# Patient Record
Sex: FEMALE | Race: BLACK OR AFRICAN AMERICAN | Employment: OTHER | URBAN - METROPOLITAN AREA
[De-identification: names, ages, dates, MRNs, and addresses within clinical notes are randomized per-mention and may not be internally consistent; named-entity substitution may affect disease eponyms.]

---

## 2020-06-23 ENCOUNTER — HOSPITAL ENCOUNTER (INPATIENT)
Age: 50
LOS: 9 days | Discharge: HOME | DRG: 885 | End: 2020-07-02
Attending: PSYCHIATRY & NEUROLOGY | Admitting: PSYCHIATRY & NEUROLOGY
Payer: OTHER GOVERNMENT

## 2020-06-23 PROBLEM — F25.9 SCHIZOAFFECTIVE DISORDER (HCC): Status: ACTIVE | Noted: 2020-06-23

## 2020-06-23 PROCEDURE — 74011250637 HC RX REV CODE- 250/637: Performed by: PSYCHIATRY & NEUROLOGY

## 2020-06-23 PROCEDURE — 65220000003 HC RM SEMIPRIVATE PSYCH

## 2020-06-23 RX ORDER — DULOXETIN HYDROCHLORIDE 30 MG/1
30 CAPSULE, DELAYED RELEASE ORAL DAILY
Status: DISCONTINUED | OUTPATIENT
Start: 2020-06-23 | End: 2020-07-02 | Stop reason: HOSPADM

## 2020-06-23 RX ORDER — PRAZOSIN HYDROCHLORIDE 5 MG/1
5 CAPSULE ORAL
Status: DISCONTINUED | OUTPATIENT
Start: 2020-06-24 | End: 2020-07-02 | Stop reason: HOSPADM

## 2020-06-23 RX ORDER — ARIPIPRAZOLE 10 MG/1
30 TABLET ORAL DAILY
Status: DISCONTINUED | OUTPATIENT
Start: 2020-06-23 | End: 2020-06-24

## 2020-06-23 RX ORDER — HALOPERIDOL 5 MG/1
5 TABLET ORAL
Status: DISCONTINUED | OUTPATIENT
Start: 2020-06-23 | End: 2020-07-02 | Stop reason: HOSPADM

## 2020-06-23 RX ORDER — LORAZEPAM 1 MG/1
1 TABLET ORAL
Status: DISCONTINUED | OUTPATIENT
Start: 2020-06-23 | End: 2020-07-02 | Stop reason: HOSPADM

## 2020-06-23 RX ORDER — DULOXETIN HYDROCHLORIDE 30 MG/1
30 CAPSULE, DELAYED RELEASE ORAL DAILY
Status: DISCONTINUED | OUTPATIENT
Start: 2020-06-24 | End: 2020-06-23

## 2020-06-23 RX ORDER — ARIPIPRAZOLE 10 MG/1
30 TABLET ORAL DAILY
Status: DISCONTINUED | OUTPATIENT
Start: 2020-06-24 | End: 2020-06-23

## 2020-06-23 RX ORDER — HYDROXYZINE PAMOATE 50 MG/1
50 CAPSULE ORAL
Status: DISCONTINUED | OUTPATIENT
Start: 2020-06-23 | End: 2020-07-02 | Stop reason: HOSPADM

## 2020-06-23 RX ORDER — ZOLMITRIPTAN 2.5 MG/1
5 TABLET, FILM COATED ORAL AS NEEDED
Status: DISCONTINUED | OUTPATIENT
Start: 2020-06-23 | End: 2020-06-23 | Stop reason: RX

## 2020-06-23 RX ORDER — HALOPERIDOL 5 MG/ML
5 INJECTION INTRAMUSCULAR
Status: DISCONTINUED | OUTPATIENT
Start: 2020-06-23 | End: 2020-07-02 | Stop reason: HOSPADM

## 2020-06-23 RX ORDER — TRAZODONE HYDROCHLORIDE 50 MG/1
50 TABLET ORAL
Status: DISCONTINUED | OUTPATIENT
Start: 2020-06-23 | End: 2020-07-02 | Stop reason: HOSPADM

## 2020-06-23 RX ORDER — LORAZEPAM 1 MG/1
2 TABLET ORAL
Status: DISCONTINUED | OUTPATIENT
Start: 2020-06-23 | End: 2020-07-02 | Stop reason: HOSPADM

## 2020-06-23 RX ORDER — ZOLPIDEM TARTRATE 5 MG/1
5 TABLET ORAL
Status: DISCONTINUED | OUTPATIENT
Start: 2020-06-23 | End: 2020-07-02 | Stop reason: HOSPADM

## 2020-06-23 RX ADMIN — DULOXETINE 30 MG: 30 CAPSULE, DELAYED RELEASE ORAL at 21:32

## 2020-06-23 RX ADMIN — ARIPIPRAZOLE 30 MG: 10 TABLET ORAL at 21:32

## 2020-06-23 RX ADMIN — ZOLPIDEM TARTRATE 5 MG: 5 TABLET ORAL at 21:33

## 2020-06-23 NOTE — GROUP NOTE
COURTNEY ALMONTE GROUP DOCUMENTATION INDIVIDUAL Group Therapy Note Date: 6/23/2020 Group Start Time: 5641 Group End Time: 1175 Group Topic: Nursing [facility] 1 ADULT CHEM Priyanka Velazquez RN IP BH GROUP DOCUMENTATION GROUP Group Therapy Note Attendees: 4 Attendance: Did not attend Rosalinda Yañez RN

## 2020-06-23 NOTE — BH NOTES
MELONIE Admission Note: Pt. Arrived on the unit ambulatory with ambulance worker with her belongings. Pt was checked for contraband upon admission on the unit. Pt was given a copy of the rules upon admission. She was explained in detail of the unit rules during this admission process.

## 2020-06-23 NOTE — PROGRESS NOTES
Patient arrived to unit via wheel chair with staff members present. Upon arrival patient in good, pleasant mood able to answer all questions appropriately. Patient Verbalizes being SI \"all the time. \" But does not plan in harming herself. Patient states \"I will let yall know if I feel like hearting myself. \" Per patient she has recently changed medications at her request and feels \"I need to get back on my old medication. \" \"I don't feel like myself and just want to get better. \" Confirms hearing voices. Per patient Voices continue to call her name. When patient is trying to sleep, she reports \"feeling a presence and feels like someone is blowing air on her face. \"  Vitals maintained. WNL. Orientated to unit. Contraband search complete. 100% of lunch eaten. Patient now resting in room quietly. Will continue to guide and assist patient as needed. RN's will initiate, develop, implement, review or revise treatment plan.

## 2020-06-23 NOTE — BSMART NOTE
OCCUPATIONAL THERAPY PROGRESS NOTE Group Time:  6113 Attendance: The patient attended full group. Participation: The patient participated with minimal elaboration in the activity. Attention: The patient was able to focus on the activity. Interaction: The patient acknowledges others or responds to questions,  with no spontaneous interaction. Able to ID positive self talk to use.

## 2020-06-24 PROBLEM — F25.0 SCHIZOAFFECTIVE DISORDER, BIPOLAR TYPE (HCC): Status: ACTIVE | Noted: 2020-06-23

## 2020-06-24 LAB
ALBUMIN SERPL-MCNC: 3.3 G/DL (ref 3.4–5)
ALBUMIN/GLOB SERPL: 1 {RATIO} (ref 0.8–1.7)
ALP SERPL-CCNC: 84 U/L (ref 45–117)
ALT SERPL-CCNC: 18 U/L (ref 13–56)
AST SERPL-CCNC: 12 U/L (ref 10–38)
BILIRUB DIRECT SERPL-MCNC: <0.1 MG/DL (ref 0–0.2)
BILIRUB SERPL-MCNC: 1 MG/DL (ref 0.2–1)
CHOLEST SERPL-MCNC: 197 MG/DL
GLOBULIN SER CALC-MCNC: 3.4 G/DL (ref 2–4)
HBA1C MFR BLD: 5.6 % (ref 4.2–5.6)
HDLC SERPL-MCNC: 48 MG/DL (ref 40–60)
HDLC SERPL: 4.1 {RATIO} (ref 0–5)
LDLC SERPL CALC-MCNC: 129.4 MG/DL (ref 0–100)
LIPID PROFILE,FLP: ABNORMAL
PROT SERPL-MCNC: 6.7 G/DL (ref 6.4–8.2)
TRIGL SERPL-MCNC: 98 MG/DL (ref ?–150)
TSH SERPL DL<=0.05 MIU/L-ACNC: 0.84 UIU/ML (ref 0.36–3.74)
VLDLC SERPL CALC-MCNC: 19.6 MG/DL

## 2020-06-24 PROCEDURE — 80061 LIPID PANEL: CPT

## 2020-06-24 PROCEDURE — 65220000003 HC RM SEMIPRIVATE PSYCH

## 2020-06-24 PROCEDURE — 93005 ELECTROCARDIOGRAM TRACING: CPT

## 2020-06-24 PROCEDURE — 80076 HEPATIC FUNCTION PANEL: CPT

## 2020-06-24 PROCEDURE — 74011250637 HC RX REV CODE- 250/637: Performed by: PSYCHIATRY & NEUROLOGY

## 2020-06-24 PROCEDURE — 84443 ASSAY THYROID STIM HORMONE: CPT

## 2020-06-24 PROCEDURE — 83036 HEMOGLOBIN GLYCOSYLATED A1C: CPT

## 2020-06-24 RX ORDER — LITHIUM CARBONATE 300 MG
300 TABLET ORAL 2 TIMES DAILY
Status: DISCONTINUED | OUTPATIENT
Start: 2020-06-24 | End: 2020-06-27

## 2020-06-24 RX ORDER — DILTIAZEM HYDROCHLORIDE 120 MG/1
120 CAPSULE, COATED, EXTENDED RELEASE ORAL DAILY
Status: DISCONTINUED | OUTPATIENT
Start: 2020-06-25 | End: 2020-07-02 | Stop reason: HOSPADM

## 2020-06-24 RX ORDER — POLYETHYLENE GLYCOL 3350 17 G/17G
17 POWDER, FOR SOLUTION ORAL DAILY
Status: DISCONTINUED | OUTPATIENT
Start: 2020-06-24 | End: 2020-07-02 | Stop reason: HOSPADM

## 2020-06-24 RX ORDER — VALACYCLOVIR HYDROCHLORIDE 500 MG/1
500 TABLET, FILM COATED ORAL DAILY
Status: DISCONTINUED | OUTPATIENT
Start: 2020-06-24 | End: 2020-07-02 | Stop reason: HOSPADM

## 2020-06-24 RX ADMIN — LITHIUM CARBONATE 300 MG: 300 TABLET ORAL at 21:01

## 2020-06-24 RX ADMIN — HALOPERIDOL 5 MG: 5 TABLET ORAL at 08:42

## 2020-06-24 RX ADMIN — POLYETHYLENE GLYCOL 3350 17 G: 17 POWDER, FOR SOLUTION ORAL at 21:03

## 2020-06-24 RX ADMIN — HYDROXYZINE PAMOATE 50 MG: 50 CAPSULE ORAL at 08:42

## 2020-06-24 RX ADMIN — ZOLPIDEM TARTRATE 5 MG: 5 TABLET ORAL at 21:01

## 2020-06-24 RX ADMIN — CARIPRAZINE 1.5 MG: 1.5 CAPSULE, GELATIN COATED ORAL at 21:01

## 2020-06-24 RX ADMIN — VALACYCLOVIR HYDROCHLORIDE 500 MG: 500 TABLET, FILM COATED ORAL at 12:38

## 2020-06-24 NOTE — PROGRESS NOTES
conducted an Spirituality Group for Meghann De León, who is a 52 y.o.,female. Patient's Primary Language is: [place]. According to the patient's EMR Bahai Affiliation is: [facility].     The reason the Patient came to the hospital is:   Patient Active Problem List    Diagnosis Date Noted    Schizoaffective disorder ([facility] [address] 06/23/2020         conducted Spirituality Group for ________________ who was one of ____participants. The  provided the following Interventions:  Continued the relationship of care and support. Listened empathically. Offered prayer and assurance of continued prayer on patients behalf. Chart reviewed. The following outcomes were achieved:  Patient expressed gratitude for 's visit. Assessment:  There are no further spiritual or Restorationist issues which require Spiritual Care Services interventions at this time. Plan:  Chaplains will continue to follow and will provide pastoral care on an as needed/requested basis.  recommends bedside caregivers page  on duty if patient shows signs of acute spiritual or emotional distress.        [address] [employer]   [phone]

## 2020-06-24 NOTE — GROUP NOTE
COURTNEY  GROUP DOCUMENTATION INDIVIDUAL Group Therapy Note Date: 6/23/2020 Group Start Time: 2015 Group End Time: 2045 Group Topic: Nursing [facility] ADULT CHEM DEP Candice Reina, RN 
 
[facility] GROUP DOCUMENTATION GROUP Group Therapy Note Attendees: 9 Attendance: Attended Interventions/techniques: Challenged and Informed Follows Directions: Followed directions Interactions: Interacted appropriately Mental Status: Calm Behavior/appearance: Cooperative Goals Achieved: Able to listen to others Myla Berman Carls

## 2020-06-24 NOTE — BH NOTES
PTA meds updated with list from [facility] [phone]. Updated & current medication list noted with \"taken in past week. \"  Medications ordered via telephone order from doctor.

## 2020-06-24 NOTE — BSMART NOTE
OCCUPATIONAL THERAPY PROGRESS NOTE Group time:1020 Sinan Cowan The patient did not awaken/get up when called for group.
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
## 2020-06-24 NOTE — BH NOTES
Patient has been visible on the unit throughout the evening. Patient attended groups and interacted appropriately. Patient also interacted with peers while they watched television. Patient stated that she felt \"a little better, I haven't slept in days\". Staff will continue to monitor patient for safety.

## 2020-06-24 NOTE — PROGRESS NOTES
Problem: Suicide  Goal: *STG: Remains safe in hospital  Description: Patient will remain safe in the hospital daily   Outcome: Progressing Towards Goal  Note: Will contact verbalize if thoughts of self harm. Goal: *STG: Seeks staff when feelings of self harm or harm towards others arise  Description: Patient will seek staff when feelings of self harm or harm towards others arise daily   Outcome: Progressing Towards Goal  Goal: *STG: Attends activities and groups  Description: Patient will attend scheduled activities and groups daily   Outcome: Progressing Towards Goal    Denies HI. States \"I always feel suicidal.\" Patient contracted with staff that she will notify if having any thought of self harm. Patient remains in common area throughout the day. Interacting well with peers. Med compliant. Will continue to monitor and guide as needed.

## 2020-06-24 NOTE — GROUP NOTE
COURTNEY  GROUP DOCUMENTATION INDIVIDUAL Group Therapy Note Date: 6/24/2020 Group Start Time: 0483 Group End Time: 0200 Group Topic: Recreational/Music Therapy [facility] ADULT CHEM DEP Rupal Beavers RN; Smiley VALDEZ  GROUP DOCUMENTATION GROUP Group Therapy Note Attendees: 5 Attendance: Did not attend Isidoro Callahan

## 2020-06-24 NOTE — H&P
[address] [facility] HISTORY AND PHYSICAL    Name:  Ayana Plasencia  MR#:   [id number]  :  1970  ACCOUNT #:  [de-identified]  ADMIT DATE:  2020    IDENTIFYING INFORMATION:  The patient is a 28-year-old  female, admitted to this facility on a voluntary basis on the above-mentioned date. BASIS FOR ADMISSION:  The patient presented herself to the [facility] Emergency Department with a history of having problems with increased symptoms of depression and psychotic symptoms with thoughts of harming herself; however, no specific plan other than cutting her wrists. The patient stated that she had lapse on her medication compliance since she was staying in [place] with family members and her  who is very supportive of her and who is always sure that she takes her medications did not go on the trip to [place] this time. So, not taking her medications, becoming increasingly depressed, describing the presence of egodystonic auditory hallucinations, the patient describes suicidal thoughts. The patient with a chronic history of schizoaffective disorder, bipolar type, who apparently has required to be treated through the [facility] in that area, was offered with a voluntary inpatient admission to our facility which she accepted and so the basis for this hospitalization. PSYCHIATRIC HISTORY:  The patient described what appears to be a history of a mood disorder; diagnosed as schizoaffective disorder, bipolar type when she was in her 25s. It appears that the patient had done well in the past with a combination of medications which she said multiple meds had been prescribed for her; however, she described a positive treatment response to prescription for lithium carbonate and also duloxetine.   A recent change to bupropion instead of duloxetine did not help, she said, but mostly not taking her medications as prescribed is one of the reasons for which symptoms reoccurred. She has been prescribed with different atypicals, right now Abilify 30 mg a day which she has been taking, she says; however, has not been good enough to help her with the presence of egodystonic auditory hallucinations and also what appears to be increased skin sensations, possible tactile hallucinations. MEDICAL HISTORY:  The patient has a history of atrial fibrillation for which she has been prescribed with Cardizem CD. For her bipolar illness, she described being treated with lithium carbonate, duloxetine and also aripiprazole, the only dose that she knew was Abilify 30 mg daily. In addition, she has been prescribed in the past for the treatment of migraine headaches and what she described being a sleep disorder. Surgical history is apparently negative. The patient was not able to let us know the doses of medications that she was taking prior to admission; however, treatment included prescriptions for diltiazem XR; atorvastatin; prazosin; sumatriptan for migraine headaches; aripiprazole as stated; valacyclovir (Valtrex) 500 mg daily; lithium carbonate, apparent dose 300 mg twice a day; Cymbalta, not clear if 30 or 60 mg a day; trazodone 100 mg at nighttime; and Ambien 5 mg at bedtime as needed. ALLERGIES:  THE PATIENT IS DESCRIBED TO BE ALLERGIC TO IODINE. LABORATORY DATA:  Prior to her admission, multiple labs were performed at [facility] ER including a CBC with differential that showed normal test results. Urinalysis is negative. Blood chemistry showed a sodium 139, potassium 4.1, chloride of 107, blood sugar 122, BUN 16, creatinine 1.2, estimated GFR above 60 mL/min, liver function tests normal.  Urine pregnancy test negative, acetaminophen and salicylate levels below range, alcohol levels below 3.0. Urine drug screen negative.   Since admission, other tests have included a repeat of her liver function tests which are normal; a lipid panel that showed triglycerides of 98, total cholesterol 197, HDL 48, cholesterol-HDL ratio 4.1 and LDL of 129.4. A1c normal at 5.6%. TSH normal at 0.84 international units/mL. Due to her history of atrial fibrillation, an electrocardiogram has also been requested. ALCOHOL AND DRUG HISTORY:  Negative for alcohol, illicit drugs and/or abusing over-the-counter medications. FAMILIAL PSYCHIATRIC HISTORY:  The patient described this being her second marriage, having two kids, 34and 27year old daughter and son who are not at home anymore, they are grown, she says. A strong familial history of schizophrenia; she described her son, an aunt, and her mother being diagnosed as schizophrenic. On her father's side, there is a history of dementia, specifically Alzheimer's disease and also Parkinson's disease, she indicated. The patient has been  for 11 years to her second . Not clear as to how long her first marriage lasted. She retired from the [employer] after 27 years. She is being provided treatment through the [facility] in [facility].  appears to be very supportive of her. MENTAL STATUS EXAM:  Mental status exam is that of a female who looks her stated age. During this evaluation, there is no evidence of alcohol or any other type of drug-related signs of intoxication or withdrawal symptoms. During the evaluation, she described the presence of auditory hallucinations in the form of whispering voices or someone breathing in her ear, questionable tactile hallucinations also described, the patient is stating having the sensation that someone is touching her skin. No evidence of cognitive impairment noted during this initial evaluation, the patient described being depressed and having suicidal thoughts; however, able to maintain self-control here and to talk to the staff if unable to do so. The patient's insight and judgment are fair at present.   Intellectual capacity is within the average range.    CLINICAL IMPRESSION:  AXIS I:  Schizoaffective disorder, bipolar type, currently depressed versus mixed. AXIS II:  Deferred. AXIS III:  Atrial fibrillation by history. Obesity. Migraine headaches by history. Dyslipidemia by history. History of insomnia. TREATMENT PLAN:  1. The patient was admitted to the adult CD program, will be seen daily and will be referred to the groups within context of the program.  2.  The patient will be prescribed with a combination of medications which will include prescriptions for lithium carbonate and Cymbalta; however, instead of prescribing aripiprazole which the patient is taking, but not to be able to help with her psychotic symptoms, we will proceed to prescribe Vraylar 1.5 mg every night which will be started tonight. Side effects and adverse effects in association to prescription for this atypical were clearly discussed with the patient. 3.  The patient will have an assigned inpatient  whom we will ask to contact the patient's family. ESTIMATED LENGTH OF STAY AND PROGNOSIS:  ELOS is 5-7 days. Prognosis will depend upon treatment response and treatment compliance.       Carol Lebron MD, LFAPA      [id number]  D:  06/24/2020 10:28  T:  06/24/2020 15:13  JOB #:  [id number]

## 2020-06-24 NOTE — PROGRESS NOTES
06/24/20 [facility] work   Attendance Attended   Number of participants 6   Time in 1030   Time out 1103   Total Time 33   Interventions/techniques Supported; Informed;Validated;Promoted peer support;Provided feedback   Follows directions Followed directions   Interactions Interacted appropriately   Mental Status Blunted;Depressed   Behavior/appearance Attentive; Cooperative   Long Term Risk of Suicide Low   Immediate Risk for Suicide Low   Suicide Protective Factors Contacts reliable for safety   Risk of Violence Low   Risk of Trauma High   Goals Achieved Able to receive feedback; Able to listen to others; Able to manage/cope with feelings

## 2020-06-24 NOTE — BSMART NOTE
ART THERAPY GROUP PROGRESS NOTE PATIENT SCHEDULED FOR GROUP AT: 1400 ATTENDANCE: Full PARTICIPATION LEVEL: Participates fully in the art process ATTENTION LEVEL : Able to focus on task FOCUS: Grounding SYMBOLIC & THEMATIC CONTENT AS NOTED IN IMAGERY: She was calm, compliant, and presented with a blunted affect. She was invested in the task at hand and her approach to task was planned-out and purposeful. She mentioned feeling that her life's stress is getting in the way of her finding peace, however was vague and spoke minimally.

## 2020-06-25 LAB
ATRIAL RATE: 63 BPM
CALCULATED P AXIS, ECG09: 82 DEGREES
CALCULATED R AXIS, ECG10: 70 DEGREES
CALCULATED T AXIS, ECG11: 64 DEGREES
DIAGNOSIS, 93000: NORMAL
P-R INTERVAL, ECG05: 148 MS
Q-T INTERVAL, ECG07: 408 MS
QRS DURATION, ECG06: 86 MS
QTC CALCULATION (BEZET), ECG08: 417 MS
VENTRICULAR RATE, ECG03: 63 BPM

## 2020-06-25 PROCEDURE — 74011250637 HC RX REV CODE- 250/637: Performed by: PSYCHIATRY & NEUROLOGY

## 2020-06-25 PROCEDURE — 65220000003 HC RM SEMIPRIVATE PSYCH

## 2020-06-25 RX ADMIN — ZOLPIDEM TARTRATE 5 MG: 5 TABLET ORAL at 21:32

## 2020-06-25 RX ADMIN — DILTIAZEM HYDROCHLORIDE 120 MG: 120 CAPSULE, COATED, EXTENDED RELEASE ORAL at 08:27

## 2020-06-25 RX ADMIN — PRAZOSIN HYDROCHLORIDE 5 MG: 5 CAPSULE ORAL at 21:00

## 2020-06-25 RX ADMIN — LITHIUM CARBONATE 300 MG: 300 TABLET ORAL at 21:13

## 2020-06-25 RX ADMIN — VALACYCLOVIR HYDROCHLORIDE 500 MG: 500 TABLET, FILM COATED ORAL at 08:27

## 2020-06-25 RX ADMIN — DULOXETINE 30 MG: 30 CAPSULE, DELAYED RELEASE ORAL at 08:27

## 2020-06-25 RX ADMIN — LITHIUM CARBONATE 300 MG: 300 TABLET ORAL at 08:27

## 2020-06-25 RX ADMIN — POLYETHYLENE GLYCOL 3350 17 G: 17 POWDER, FOR SOLUTION ORAL at 21:34

## 2020-06-25 RX ADMIN — CARIPRAZINE 1.5 MG: 1.5 CAPSULE, GELATIN COATED ORAL at 21:11

## 2020-06-25 NOTE — BH NOTES
Patient was calm and cooperative towards unit rules. Although she did participate in groups and went outside for fresh air she was quiet and isolative. Patient minimally engaged with others, but was respectful when interacting with others. Patient was laying down more in the bed during this shift. Staff will continue to monitor patient for safety.

## 2020-06-25 NOTE — GROUP NOTE
[facility] GROUP DOCUMENTATION INDIVIDUAL Group Therapy Note Date: 6/24/2020 Group Start Time: 2000 Group End Time: 2030 Group Topic: Nursing [facility] 1 ADULT CHEM DEP Omar Pires, RN 
 
[facility] GROUP DOCUMENTATION GROUP Group Therapy Note Attendees: 6 Attendance: Attended Interventions/techniques: Challenged and Informed Follows Directions: Followed directions Interactions: Interacted appropriately Mental Status: Calm Behavior/appearance: Attentive and Cooperative Goals Achieved: Able to listen to others and Able to give feedback to another Valerie Walter.  Wes Villegas

## 2020-06-25 NOTE — BSMART NOTE
OCCUPATIONAL THERAPY PROGRESS NOTE Group Time:  0326 Attendance: The patient attended full group. Participation: The patient participated with minimal elaboration in the activity. Attention: The patient was able to focus on the activity. Interaction: The patient acknowledges others or responds to questions,  with no spontaneous interaction. Able to ID positive affirmation and thought to work on related to her stated idea to be more positive about the relationships she has.

## 2020-06-25 NOTE — BSMART NOTE
Pt. Is a 45-year-old female with history of Schizoaffective disorder, bipolar type, currently depressed versus mixed and PTSD. Pt. Was admitted to this facility for depression and ideations to harm self with a plan to cut self with a knife. SW Contact:  SW met with pt. to discuss dc planning. \" I am okay, I guess\". \" I still feel something touching and breathing on my face\". Pt. Expressed having tactile  sensations in addition to disruption of sleep. Pt contracts for safety. Pt. informed SW she was upset with spouse after he had contacted. Pt. Expressed she does not want to feel pressured in talking to him and other family members until she is ready. Pt. States she plans to call some family members today. Pt. Feels medication is working and notes some small improvements. SW discussed coping strategies. Pt states she cooks to cope. SW discussed the benefits of therapy. Pt is interested in having a therapist addition to receiving output services at the [facility] program. Pt. 's mood is slowly improving, has fair insight and judgment. SW will continue to provide supports and assist with dc planning. Gabe Vergara MA, LMHP_R Covering

## 2020-06-25 NOTE — BSMART NOTE
ART THERAPY GROUP PROGRESS NOTE PATIENT SCHEDULED FOR GROUP AT: 7670 ATTENDANCE: Full PARTICIPATION LEVEL: Participates fully in the art process ATTENTION LEVEL : Able to focus on task FOCUS: Stress vs peace SYMBOLIC & THEMATIC CONTENT AS NOTED IN IMAGERY: She was calm, compliant, and invested in the task at hand. She claimed that she struggles with finding things to do since group home and that she needs to [place] more active. \" She was able to identify healthy means to take care of both her physical and mental wellbeing.

## 2020-06-25 NOTE — BSMART NOTE
SOCIAL WORK GROUP THERAPY PROGRESS NOTE Group Time:  10:15am   1:15pm 
 
Group Topic:  Coping Skills    C D Issues Group Participation:   
 
Pt moderately involved during group discussion but remained attentive. Discussion included the process of making \"Change\" by answering questions on handout with an emphasis on strengths & weaknesses to support improving one's self esteem. Did identify several positives about self. Understanding better connection between thoughts & Emotions

## 2020-06-25 NOTE — PROGRESS NOTES
[address]  Inpatient Progress Note     Date of Service: 06/25/20  Hospital Day: 2     Subjective/Interval History   06/25/20    Treatment Team Notes:  Notes reviewed and/or discussed and report that Meghann Sousa is a pt with a hx of Schizoaffective disorder, recently admitted after not being tx compliant for a couple of weeks. Symptoms recurring was the bases for her admission, attention invited to the dictated adm note for details. Patient interview: Meghann Sousa was interviewed by this writer today. Treatment with Ann Distel was started. Initial tolerance is acceptable, the pt denying side effects. Obviously is too soon to see any changes. The pt and the undersigned met this morning, with a review of her labs following. Objective     Visit Vitals  /74 (BP 1 Location: Right arm, BP Patient Position: Sitting)   Pulse 79   Temp 98.9 °F (37.2 °C)   Resp 16   Ht 5' 9\" (1.753 m)   Wt 108.4 kg (239 lb)   BMI 35.29 kg/m²     Vitals are stable. No results found for this or any previous visit (from the past 24 hour(s)). Mental Status Examination     Appearance/Hygiene 52 y.o. BLACK OR  female  Hygiene: fair. Behavior/Social Relatedness Appropriate   Musculoskeletal Gait/Station: appropriate  Tone (flaccid, cogwheeling, spastic): not assessed  Psychomotor (hyperkinetic, hypokinetic): calm   Involuntary movements (tics, dyskinesias, akathisa, stereotypies): none   Speech   Rate, rhythm, volume, fluency and articulation are appropriate   Mood   Mixed. Affect    Labile. Thought Process Linear and goal directed   Thought Content and Perceptual Disturbances Denies self-injurious behavior (SIB), suicidal ideation (SI), aggressive behavior or homicidal ideation (HI)  Still describing the presence of auditory hallucinations, mood remains labile. Sensorium and Cognition  Grossly intact   Insight  Improving. Judgment Fair.         Assessment/Plan Psychiatric Diagnoses:   Patient Active Problem List   Diagnosis Code    Schizoaffective disorder, [facility] ([facility] F25.0       Medical Diagnoses: same. Psychosocial and contextual factors: same. Level of impairment/disability: high. 1.  Treatment with Tillman Kawasaki will continue. ,the pt's insurance approves tx with this specific atypical. Will suggest when the prescription is written, to obtain her prescription through her mail prescription options. This will be cheaper for the pt. 2.  Reviewed instructions, risks, benefits and side effects of medications  3. Disposition/Discharge Date: self-care/home, when stable.     Jane Valencia MD, [address] [place] [address]  Psychiatry

## 2020-06-25 NOTE — PROGRESS NOTES
Problem: Suicide  Goal: *STG: Remains safe in hospital  Description: Patient will remain safe in the hospital daily   Outcome: Progressing Towards Goal  Note: Patient will remain safe in the hospital daily  Goal: *STG: Attends activities and groups  Description: Patient will attend scheduled activities and groups daily   Outcome: Progressing Towards Goal  Note: Patient will attend scheduled activities and groups daily       Problem: Depressed Mood (Adult/Pediatric)  Goal: *STG: Complies with medication therapy  Description: Patient will comply with medication therapy daily   Outcome: Progressing Towards Goal  Note: Patient will comply with medication therapy daily     Patient has been visible and active in the day area during this shift. She has attended groups, complied with meds, and engaging with peers. Patient has not verbalized any current delusions or paranoia. Denies current thoughts of self harm. Mood appears stable. No report of dry tongue or side effects today  Will continue to monitor for safety and support.

## 2020-06-26 PROCEDURE — 74011250637 HC RX REV CODE- 250/637: Performed by: PSYCHIATRY & NEUROLOGY

## 2020-06-26 PROCEDURE — 65220000003 HC RM SEMIPRIVATE PSYCH

## 2020-06-26 RX ADMIN — CARIPRAZINE 1.5 MG: 1.5 CAPSULE, GELATIN COATED ORAL at 20:39

## 2020-06-26 RX ADMIN — LITHIUM CARBONATE 300 MG: 300 TABLET ORAL at 20:39

## 2020-06-26 RX ADMIN — ZOLPIDEM TARTRATE 5 MG: 5 TABLET ORAL at 20:39

## 2020-06-26 RX ADMIN — LITHIUM CARBONATE 300 MG: 300 TABLET ORAL at 08:11

## 2020-06-26 RX ADMIN — PRAZOSIN HYDROCHLORIDE 5 MG: 5 CAPSULE ORAL at 21:00

## 2020-06-26 RX ADMIN — POLYETHYLENE GLYCOL 3350 17 G: 17 POWDER, FOR SOLUTION ORAL at 20:39

## 2020-06-26 RX ADMIN — DILTIAZEM HYDROCHLORIDE 120 MG: 120 CAPSULE, COATED, EXTENDED RELEASE ORAL at 08:11

## 2020-06-26 RX ADMIN — DULOXETINE 30 MG: 30 CAPSULE, DELAYED RELEASE ORAL at 08:11

## 2020-06-26 RX ADMIN — VALACYCLOVIR HYDROCHLORIDE 500 MG: 500 TABLET, FILM COATED ORAL at 08:11

## 2020-06-26 NOTE — PROGRESS NOTES
[address]  Inpatient Progress Note     Date of Service: 06/26/20  Hospital Day: 3     Subjective/Interval History   06/26/20    Treatment Team Notes:  Notes reviewed and/or discussed and report that Meghann Holland is a pt with a hx of Schizoaffective disorder, still having AH and tactile hallucinations, switched to cariprazine which was started last night. Initial tolerance is good, mild drowsiness described today. Patient interview: Meghann Holland was interviewed by this writer today. As above, cooperating with tx, compliant with her meds, will draw a li level tomorrow. Objective     Visit Vitals  BP 90/62   Pulse 79   Temp 98.3 °F (36.8 °C)   Resp 18   Ht 5' 9\" (1.753 m)   Wt 108.4 kg (239 lb)   BMI 35.29 kg/m²     Mild hypotension, asymptomatic. No results found for this or any previous visit (from the past 24 hour(s)). Mental Status Examination     Appearance/Hygiene 52 y.o. BLACK OR  female  Hygiene: fair   Behavior/Social Relatedness Appropriate   Musculoskeletal Gait/Station: appropriate  Tone (flaccid, cogwheeling, spastic): not assessed  Psychomotor (hyperkinetic, hypokinetic): calmer   Involuntary movements (tics, dyskinesias, akathisa, stereotypies): none   Speech   Rate, rhythm, volume, fluency and articulation are appropriate   Mood   Mixed. Affect    Less labile. Thought Process Linear and goal directed   Thought Content and Perceptual Disturbances Denies self-injurious behavior (SIB), suicidal ideation (SI), aggressive behavior or homicidal ideation (HI)  AH and tactile hallucinations present. Still depressed, with mixed presentation. Sensorium and Cognition  Grossly intact   Insight  Fair. Judgment Fair. Assessment/Plan      Psychiatric Diagnoses:   Patient Active Problem List   Diagnosis Code    Schizoaffective disorder, bipolar [facility] ([facility] F25.0       Medical Diagnoses: same.     Psychosocial and contextual factors: same.    Level of impairment/disability: high. 1.  Will continue current medications combination. Lithium levels in the morning. Will repeat BMP also. 2.  Reviewed instructions, risks, benefits and side effects of medications  3. Disposition/Discharge Date: self-care/home.     Kaylyn Herrera MD, [address] [place] [address]

## 2020-06-26 NOTE — BSMART NOTE
OCCUPATIONAL THERAPY PROGRESS NOTE Group Time:  6080 Attendance: The patient attended full group. Participation: The patient participated with moderate elaboration in the activity. Attention: The patient was able to focus on the activity. Interaction: The patient acknowledges others or responds to questions,  with no spontaneous interaction. Participated as asked in discussion on stress management. Able to ID some stressors.

## 2020-06-26 NOTE — BH NOTES
Paranoid guarded dull flat sad isolative quiet withdrawn. Interacts very little with staff and peers. Reserved. Does not attend RN group. Will continue to monitor and support.

## 2020-06-26 NOTE — BSMART NOTE
ART THERAPY GROUP PROGRESS NOTE PATIENT SCHEDULED FOR GROUP AT: 9:15 
 
ATTENDANCE: Full PARTICIPATION LEVEL: Participates fully in the art process. ATTENTION LEVEL: Able to focus on task. FOCUS: Self Affirmation SYMBOLIC & THEMATIC CONTENT AS NOTED IN IMAGERY: She presented with a euthymic mood and congruent affect and her thought processes were logical. She was actively engaged in the art therapy directive and her approach to task was intentional. She kept to herself and was highly focused on the task at hand. She was engaged in group discussion and participated in sharing her work with the group. Thematic content was organized and appropriate for the given directive and focused on her goals for the future.

## 2020-06-26 NOTE — BSMART NOTE
SOCIAL WORK GROUP THERAPY PROGRESS NOTE Group Time:  10:15am    
 
Group Topic:  Coping Skills    C D Issues Group Participation:   
 
Pt moderately involved during group discussion and remained attentive. As session progressed was more involved. \"Seven Steps\" for taking responsibility for our Happiness was reviewed including commitment to change, self-care, setting limits, goal setting & letting go. Admitted needs to set better boundaries & build support system. Explored \"my body's\" anger warning signs as well as common triggers.

## 2020-06-26 NOTE — BSMART NOTE
Pt. Is a 70-year-old female  with history of Schizoaffective disorder, bipolar type, currently depressed versus mixed and PTSD. Pt. Was admitted to this facility for depression and ideations to harm self with a plan to cut self with a knife Pt.'s case was discussed in staffing this am  
 
SW Contact:  SW met with pt. to discuss dc planning. \" I am feeling better\". Pt. continues to endorse still having tactile sensations. SW discussed Some CBT coping strategies. Pt.s' mood is improving, less depress, appears hopeful and has good insight. Pt. plans at dc to return to her home address. SW will continue to provide supports and assist with dc planning. Fredi Baca MA, LMHP_R Covering

## 2020-06-26 NOTE — GROUP NOTE
COURTNEY  GROUP DOCUMENTATION INDIVIDUAL Group Therapy Note Date: 6/26/2020 Group Start Time: 1300 Group End Time: 7371 Group Topic: Nursing [facility] 1 ADULT CHEM Priyanka Velazquez, RN 
 
[facility] GROUP DOCUMENTATION GROUP Group Therapy Note Attendees: 5 Attendance: Attended Patient's Goal:  Coping with recreation Interventions/techniques: Promoted peer support, Reinforced and Supported Follows Directions: Followed directions Interactions: Interacted appropriately Mental Status: Calm Behavior/appearance: Cooperative Goals Achieved: Able to engage in interactions, Able to listen to others, Able to give feedback to another, Able to reflect/comment on own behavior and Able to manage/cope with feelings Additional Notes:   
 
Jann Dozier RN

## 2020-06-26 NOTE — BH NOTES
MELONIE Note: The above pt has been alert and cooperative the entire shift. She has been on the phone wit family and friends which appeared to have went well this shift. She has attended all scheduled groups this shift. She has engaged with her peers and has been able to give them words of encouragement to help them deal with there stressors upon discharge this shift. She appeared to be eager to get discharged to start her cooking which she verbalized \"Cooking helps me cope with my stressors when I can not control the situation\". She was educated on positive and negative cooping skills and different ways to deal with her stressors when she can not cook. She was receptive of this information at this time. She has not displayed any l;oud outburst or aggressive behavior during this shift. She verbally contract for safety and denies any self-harm or falls at this time.

## 2020-06-26 NOTE — GROUP NOTE
IP  GROUP DOCUMENTATION INDIVIDUAL Group Therapy Note Date: 6/25/2020 Group Start Time: 2030 Group End Time: 2100 Group Topic: Nursing [facility] 1 ADULT CHEM DEP Arlene Liang, RN 
 
[facility] GROUP DOCUMENTATION GROUP Group Therapy Note Attendees: 4 Attendance: Attended Interventions/techniques: Challenged and Informed Follows Directions: Followed directions Interactions: Interacted appropriately Mental Status: Calm Behavior/appearance: Cooperative Goals Achieved: Able to engage in interactions, Able to listen to others and Able to give feedback to another Neftaly Bello

## 2020-06-27 PROBLEM — E66.9 OBESITY (BMI 30-39.9): Status: ACTIVE | Noted: 2020-06-27

## 2020-06-27 PROBLEM — Z86.69 HISTORY OF MIGRAINE HEADACHES: Status: ACTIVE | Noted: 2020-06-27

## 2020-06-27 PROBLEM — Z86.79 ATRIAL FIBRILLATION, CURRENTLY IN SINUS RHYTHM: Status: ACTIVE | Noted: 2020-06-27

## 2020-06-27 PROBLEM — E78.5 DYSLIPIDEMIA: Status: ACTIVE | Noted: 2020-06-27

## 2020-06-27 LAB
ANION GAP SERPL CALC-SCNC: 2 MMOL/L (ref 3–18)
BUN SERPL-MCNC: 16 MG/DL (ref 7–18)
BUN/CREAT SERPL: 15 (ref 12–20)
CALCIUM SERPL-MCNC: 8.9 MG/DL (ref 8.5–10.1)
CHLORIDE SERPL-SCNC: 107 MMOL/L (ref 100–111)
CO2 SERPL-SCNC: 29 MMOL/L (ref 21–32)
CREAT SERPL-MCNC: 1.07 MG/DL (ref 0.6–1.3)
GLUCOSE SERPL-MCNC: 97 MG/DL (ref 74–99)
LITHIUM SERPL-SCNC: 0.3 MMOL/L (ref 0.6–1.2)
POTASSIUM SERPL-SCNC: 4.5 MMOL/L (ref 3.5–5.5)
SODIUM SERPL-SCNC: 138 MMOL/L (ref 136–145)

## 2020-06-27 PROCEDURE — 65220000003 HC RM SEMIPRIVATE PSYCH

## 2020-06-27 PROCEDURE — 74011250637 HC RX REV CODE- 250/637: Performed by: PSYCHIATRY & NEUROLOGY

## 2020-06-27 PROCEDURE — 80178 ASSAY OF LITHIUM: CPT

## 2020-06-27 PROCEDURE — 36415 COLL VENOUS BLD VENIPUNCTURE: CPT

## 2020-06-27 PROCEDURE — 80048 BASIC METABOLIC PNL TOTAL CA: CPT

## 2020-06-27 RX ORDER — LITHIUM CARBONATE 300 MG
600 TABLET ORAL 2 TIMES DAILY
Status: DISCONTINUED | OUTPATIENT
Start: 2020-06-27 | End: 2020-07-02 | Stop reason: HOSPADM

## 2020-06-27 RX ADMIN — TRAZODONE HYDROCHLORIDE 50 MG: 50 TABLET ORAL at 20:28

## 2020-06-27 RX ADMIN — POLYETHYLENE GLYCOL 3350 17 G: 17 POWDER, FOR SOLUTION ORAL at 20:26

## 2020-06-27 RX ADMIN — DULOXETINE 30 MG: 30 CAPSULE, DELAYED RELEASE ORAL at 08:31

## 2020-06-27 RX ADMIN — LITHIUM CARBONATE 300 MG: 300 TABLET ORAL at 08:31

## 2020-06-27 RX ADMIN — DILTIAZEM HYDROCHLORIDE 120 MG: 120 CAPSULE, COATED, EXTENDED RELEASE ORAL at 08:31

## 2020-06-27 RX ADMIN — VALACYCLOVIR HYDROCHLORIDE 500 MG: 500 TABLET, FILM COATED ORAL at 08:31

## 2020-06-27 RX ADMIN — LITHIUM CARBONATE 600 MG: 300 TABLET ORAL at 20:25

## 2020-06-27 RX ADMIN — PRAZOSIN HYDROCHLORIDE 5 MG: 5 CAPSULE ORAL at 21:00

## 2020-06-27 RX ADMIN — CARIPRAZINE 3 MG: 1.5 CAPSULE, GELATIN COATED ORAL at 20:25

## 2020-06-27 NOTE — PROGRESS NOTES
[address] [facility] [address]  Inpatient Progress Note     Date of Service: 06/27/20  Hospital Day: 4     Subjective/Interval History   06/27/20    Treatment Team Notes:  Notes reviewed and/or discussed and report that Meghann De León is a pt with a hx of Schizoaffective disorder, showing mild improvement. Good tolerance to current tx with cariprazine described. Dose will be increased. Patient interview: Meghann De León was interviewed by this writer today. The pt continues to describe the presence of tactile hallucinations and auditory hallucinations. As stated, tolerance to Sorin Jen is fairly good. Will increase it's dose tonight. In addition, lithium levels are rather low, will increase the dose of li to 600 mg BID. Levels to be drawn in several days. Objective     Visit Vitals  /77 (BP 1 Location: Right arm, BP Patient Position: Sitting)   Pulse (!) 105   Temp 98.3 °F (36.8 °C)   Resp 18   Ht 5' 9\" (1.753 m)   Wt 108.4 kg (239 lb)   BMI 35.29 kg/m²     Mild SBP elevation. Will observe. May require a discontinuation of duloxetine due to it's potential for BP elevation. A different antidepressant may be prescribed, however we are somewhat hesitant to do so, due to potential for a worsening of the pt's bipolar symptoms. Recent Results (from the past 24 hour(s))   METABOLIC PANEL, BASIC    Collection Time: 06/27/20  7:10 AM   Result Value Ref Range    Sodium 138 136 - 145 mmol/L    Potassium 4.5 3.5 - 5.5 mmol/L    Chloride 107 100 - 111 mmol/L    CO2 29 21 - 32 mmol/L    Anion gap 2 (L) 3.0 - 18 mmol/L    Glucose 97 74 - 99 mg/dL    BUN 16 7.0 - 18 MG/DL    Creatinine 1.07 0.6 - 1.3 MG/DL    BUN/Creatinine ratio 15 12 - 20      GFR est AA >60 >60 ml/min/1.73m2    GFR est non-AA 55 (L) >60 ml/min/1.73m2    Calcium 8.9 8.5 - 10.1 MG/DL   LITHIUM    Collection Time: 06/27/20  7:10 AM   Result Value Ref Range    Lithium level 0.30 (L) 0.6 - 1.2 MMOL/L     Above results noticed.  See lithium dose adjustment. Mental Status Examination     Appearance/Hygiene 52 y.o. BLACK OR  female  Hygiene: good. Behavior/Social Relatedness Appropriate   Musculoskeletal Gait/Station: appropriate  Tone (flaccid, cogwheeling, spastic): not assessed  Psychomotor (hyperkinetic, hypokinetic): calmer  Involuntary movements (tics, dyskinesias, akathisa, stereotypies): none   Speech   Rate, rhythm, volume, fluency and articulation are appropriate   Mood   Mixed. Affect    Labile at times. Thought Process Linear and goal directed   Thought Content and Perceptual Disturbances Denies self-injurious behavior (SIB), suicidal ideation (SI), aggressive behavior or homicidal ideation (HI)  AH and tactile hallucinations are still present. Sensorium and Cognition  Grossly intact   Insight  Improving. Judgment Fair at present. Assessment/Plan      Psychiatric Diagnoses:   Patient Active Problem List   Diagnosis Code    Schizoaffective disorder, bipolar type ([facility] F25.0       Medical Diagnoses: + obesity, elevated BP with hx of cardiac disease. Psychosocial and contextual factors: same. Level of impairment/disability: high. 1.  Case [place] will be increased. This atypical is approved for the tx of bipolar illness with all of it's subtypes including magdalene, mixed and depression. So we are hopeful that will exercise an antidepressant effect with the possibility of being able to discontinue tx with duloxetine due to the pt's elevated BP, and a decrease chance to switch to hypomania or magdalene. Will discuss it with detail when I see the pt again tomorrow. 2.  Reviewed instructions, risks, benefits and side effects of medications  3. Disposition/Discharge Date: self-care/home.     Janna Lebron MD, [address] [place] [address]  Psychiatry

## 2020-06-27 NOTE — BH NOTES
Dull flat sad isolative quiet reserved stays to self in room. Depressed. Guarded paranoid. Interacts with staff and peers minimally but, appropriately. Cooperative. Does attend RN group. Will continue to monitor and support.

## 2020-06-27 NOTE — PROGRESS NOTES
Problem: Suicide  Goal: *STG: Remains safe in hospital  Description: Patient will remain safe in the hospital daily   Outcome: Progressing Towards Goal  Note: Pt remains free of harm. Goal: *STG: Seeks staff when feelings of self harm or harm towards others arise  Description: Patient will seek staff when feelings of self harm or harm towards others arise daily   Outcome: Progressing Towards Goal  Note: Pt contracts for safety. Problem: Depressed Mood (Adult/Pediatric)  Goal: *STG: Complies with medication therapy  Description: Patient will comply with medication therapy daily   Outcome: Progressing Towards Goal  Note: Pt takes medications as ordered. Patient has been in and out of the milieu today. She remains isolative but is cooperative on approach.  Patient reports that she felt like someone grabbed her twice overnight while sleeping and appears aware that it is not real. Patient is compliant with medications and denies SI.

## 2020-06-28 PROCEDURE — 74011250637 HC RX REV CODE- 250/637: Performed by: PSYCHIATRY & NEUROLOGY

## 2020-06-28 PROCEDURE — 65220000003 HC RM SEMIPRIVATE PSYCH

## 2020-06-28 RX ADMIN — DULOXETINE 30 MG: 30 CAPSULE, DELAYED RELEASE ORAL at 08:21

## 2020-06-28 RX ADMIN — ZOLPIDEM TARTRATE 5 MG: 5 TABLET ORAL at 20:32

## 2020-06-28 RX ADMIN — POLYETHYLENE GLYCOL 3350 17 G: 17 POWDER, FOR SOLUTION ORAL at 20:33

## 2020-06-28 RX ADMIN — LITHIUM CARBONATE 600 MG: 300 TABLET ORAL at 20:35

## 2020-06-28 RX ADMIN — VALACYCLOVIR HYDROCHLORIDE 500 MG: 500 TABLET, FILM COATED ORAL at 08:21

## 2020-06-28 RX ADMIN — DILTIAZEM HYDROCHLORIDE 120 MG: 120 CAPSULE, COATED, EXTENDED RELEASE ORAL at 08:21

## 2020-06-28 RX ADMIN — LITHIUM CARBONATE 600 MG: 300 TABLET ORAL at 08:21

## 2020-06-28 RX ADMIN — CARIPRAZINE 3 MG: 1.5 CAPSULE, GELATIN COATED ORAL at 20:33

## 2020-06-28 NOTE — PROGRESS NOTES
[address] [facility] [address]  Inpatient Progress Note     Date of Service: 06/28/20  Hospital Day: 5     Subjective/Interval History   06/28/20    Treatment Team Notes:  Notes reviewed and/or discussed and report that Meghann Ray is a pt with a hx of Schizoaffective disorder bipolar type, attention invited to adm note regarding reasons for hospital admission. Patient interview: Meghann Ray was interviewed by this writer today. The pt continues to have difficulty with her sleeping through the night. She described increased dreaming, however denies nightmares. Cariprazine was recently increased. Still describing AH and tactile sensations. Objective     Visit Vitals  /76 (BP 1 Location: Right arm, BP Patient Position: Sitting)   Pulse 93   Temp 99.1 °F (37.3 °C)   Resp 18   Ht 5' 9\" (1.753 m)   Wt 108.4 kg (239 lb)   BMI 35.29 kg/m²     Vitals are stable. Mild elevation of SBP noticed above. No results found for this or any previous visit (from the past 24 hour(s)). Mental Status Examination     Appearance/Hygiene 52 y.o. BLACK OR  female  Hygiene: good. Behavior/Social Relatedness Appropriate   Musculoskeletal Gait/Station: appropriate  Tone (flaccid, cogwheeling, spastic): not assessed  Psychomotor (hyperkinetic, hypokinetic): calm   Involuntary movements (tics, dyskinesias, akathisa, stereotypies): none   Speech   Rate, rhythm, volume, fluency and articulation are appropriate   Mood   Mixed. Affect    Less labile. Thought Process Linear and goal directed   Thought Content and Perceptual Disturbances Denies self-injurious behavior (SIB), suicidal ideation (SI), aggressive behavior or homicidal ideations. Still hallucinating, still having problems with tactile sensations, which are ego dystonic. Sensorium and Cognition  Grossly intact   Insight  Improving. Judgment Improving.         Assessment/Plan      Psychiatric Diagnoses:   Patient Active Problem List Diagnosis Code    [facility] F[facility]    Atrial fibrillation, currently in sinus rhythm Z86.79    Dyslipidemia E78.5    Obesity (BMI 30-39. 9) E66.9    History of migraine headaches Z86.69       Medical Diagnoses: same. Psychosocial and contextual factors: same    Level of impairment/disability: high. 1.  Cariprazine was recently increased. The pt described a hx of response to duloxetine, thus will maintain current dose for now. 2.  Reviewed instructions, risks, benefits and side effects of medications  3. Disposition/Discharge Date: self-care/home.     Lynn Mims MD, [address] [place] [address]  Psychiatry

## 2020-06-28 NOTE — PROGRESS NOTES
Problem: Suicide  Goal: *STG: Remains safe in hospital  Description: Patient will remain safe in the hospital daily   Outcome: Progressing Towards Goal  Note: To remain safe at all times. No injury noted. Goal: *STG: Seeks staff when feelings of self harm or harm towards others arise  Description: Patient will seek staff when feelings of self harm or harm towards others arise daily   Outcome: Progressing Towards Goal  Note: Will notify staff if having any thoughts of self harm. Denies at this time. Goal: *STG: Attends activities and groups  Description: Patient will attend scheduled activities and groups daily   Outcome: Progressing Towards Goal  Note: Attends groups   Nurse Note: Remains in room resting. No distress noted. 100% of breakfast consumed. Med compliant. Denies SI/HI at this time. Voices no concerns. Will continue to guide and assist patient as needed.

## 2020-06-28 NOTE — GROUP NOTE
COURTNEY  GROUP DOCUMENTATION INDIVIDUAL Group Therapy Note Date: 6/28/2020 Group Start Time: 9268 Group End Time: 0430 Group Topic: Nursing [facility] 1 ADULT CHEM [facility] GROUP DOCUMENTATION GROUP Group Therapy Note Attendees: 5 Attendance: Attended Interventions/techniques: Informed Follows Directions: Followed directions Interactions: Interacted appropriately Mental Status: Calm Behavior/appearance: Motivated Goals Achieved: Able to engage in interactions David Mayers

## 2020-06-29 PROCEDURE — 65220000003 HC RM SEMIPRIVATE PSYCH

## 2020-06-29 PROCEDURE — 74011250637 HC RX REV CODE- 250/637: Performed by: PSYCHIATRY & NEUROLOGY

## 2020-06-29 RX ORDER — MAG HYDROX/ALUMINUM HYD/SIMETH 200-200-20
SUSPENSION, ORAL (FINAL DOSE FORM) ORAL 3 TIMES DAILY
Status: DISCONTINUED | OUTPATIENT
Start: 2020-06-29 | End: 2020-07-02 | Stop reason: HOSPADM

## 2020-06-29 RX ADMIN — VALACYCLOVIR HYDROCHLORIDE 500 MG: 500 TABLET, FILM COATED ORAL at 08:21

## 2020-06-29 RX ADMIN — PRAZOSIN HYDROCHLORIDE 5 MG: 5 CAPSULE ORAL at 21:00

## 2020-06-29 RX ADMIN — ZOLPIDEM TARTRATE 5 MG: 5 TABLET ORAL at 21:12

## 2020-06-29 RX ADMIN — Medication 1 SPRAY: at 21:15

## 2020-06-29 RX ADMIN — HYDROCORTISONE: 1 OINTMENT TOPICAL at 21:20

## 2020-06-29 RX ADMIN — DULOXETINE 30 MG: 30 CAPSULE, DELAYED RELEASE ORAL at 08:21

## 2020-06-29 RX ADMIN — HYDROCORTISONE: 1 OINTMENT TOPICAL at 16:48

## 2020-06-29 RX ADMIN — POLYETHYLENE GLYCOL 3350 17 G: 17 POWDER, FOR SOLUTION ORAL at 21:13

## 2020-06-29 RX ADMIN — CARIPRAZINE 3 MG: 1.5 CAPSULE, GELATIN COATED ORAL at 21:12

## 2020-06-29 RX ADMIN — LITHIUM CARBONATE 600 MG: 300 TABLET ORAL at 08:21

## 2020-06-29 RX ADMIN — DILTIAZEM HYDROCHLORIDE 120 MG: 120 CAPSULE, COATED, EXTENDED RELEASE ORAL at 08:21

## 2020-06-29 RX ADMIN — LITHIUM CARBONATE 600 MG: 300 TABLET ORAL at 21:12

## 2020-06-29 NOTE — BSMART NOTE
Pt. Is a 28-year-old female  with history of Schizoaffective disorder, bipolar type, currently depressed versus mixed and PTSD. Pt. Was admitted to this facility for depression and ideations to harm self with a plan to cut self with a knife. SW Contact:  SW met with patient to discuss discharge planning. Pt. Expressed that she is feeling better. Pt. reports she does not have intrusive thoughts, sleep has improved longer feels as others is touching her. Pt.  on her current relationship with her spouse. Pt Express how she has reservations about continuing to stay in the marriage. Pt states that spouse is caring loving and supportive. Pt feels as though those spouses deserves someone better than herself. SW reflected on self-efficacy and self-worth. SW was able to provide positive feedback. SW encourage pt to consider couples counseling addition to individual therapy. Pt revealed some trust issues that she's having concerned the marriage. Pt mood is improving appears less depressed and hopeless and has Fair insight. Pt. Also denies tactile sensations. Pt currently denies ideations and hallucinations. Pt plans to return to her home and follow-up outpatient services with [facility]  program.   SW will continue to assist with discharge planning. Luis Carlos Avila MA, LMHP_R

## 2020-06-29 NOTE — BSMART NOTE
OCCUPATIONAL THERAPY PROGRESS NOTE Group Time:  0937 Attendance:  . The patient attended all of group Participation: The patient participated with minimal elaboration in the activity. Attention: The patient was able to focus on the activity. Interaction: The patient acknowledges others or responds to questions,  with no spontaneous interaction. Participated as asked in ID of changes to work on. Responses similar to last week.

## 2020-06-29 NOTE — BH NOTES
Pt has been active in treatment and attending groups. Pleasant affect and responsive on approach. Will continue to monitor for safety on unit and is working    on goals for future discharge

## 2020-06-29 NOTE — PROGRESS NOTES
[address] [facility] [address]  Inpatient Progress Note     Date of Service: 06/29/20  Hospital Day: 6     Subjective/Interval History   06/29/20    Treatment Team Notes:  Notes reviewed and/or discussed and report that Meghann Felix is a pt with a Schizoaffective disorder, still describing the presence of auditory and tactile hallucinations. Patient interview: Meghann Felix was interviewed by this writer today. The above impairment of reality testing brought up during today's session. Her mood is more stable, however. Will increase her cariprazine after today;s dose. Objective     Visit Vitals  /70 (BP 1 Location: Right arm, BP Patient Position: Sitting)   Pulse 94   Temp 98.7 °F (37.1 °C)   Resp 18   Ht 5' 9\" (1.753 m)   Wt 108.4 kg (239 lb)   BMI 35.29 kg/m²     Vitals are stable. No results found for this or any previous visit (from the past 24 hour(s)). Mental Status Examination     Appearance/Hygiene 52 y.o. BLACK OR  female  Hygiene: fair. Behavior/Social Relatedness Appropriate   Musculoskeletal Gait/Station: appropriate  Tone (flaccid, cogwheeling, spastic): not assessed  Psychomotor (hyperkinetic, hypokinetic): calm   Involuntary movements (tics, dyskinesias, akathisa, stereotypies): none   Speech   Rate, rhythm, volume, fluency and articulation are appropriate   Mood   Mixed. Affect    Less labile. Thought Process Linear and goal directed   Thought Content and Perceptual Disturbances Denies self-injurious behavior (SIB), suicidal ideation (SI), aggressive behavior or homicidal ideation (HI),  AH and tactile hallucinations are still present, less intense, however. Sensorium and Cognition  Grossly intact   Insight  Good. Judgment Good.         Assessment/Plan      Psychiatric Diagnoses:   Patient Active Problem List   Diagnosis Code    [facility] F25.0    Atrial fibrillation, currently in sinus rhythm Z86.79    Dyslipidemia E78.5    Obesity (BMI 30-39. 9) E66.9    History of migraine headaches Z86.69       Medical Diagnoses: same. Psychosocial and contextual factors: same. Level of impairment/disability: high. 1.  cariprazine will be increased tomorrow night. Lithium and BMP to be drawn tomorrow. 2.  Reviewed instructions, risks, benefits and side effects of medications  3. Disposition/Discharge Date: self-care/home.     Trygve Fothergill, MD, [address] [place] [address]  Psychiatry

## 2020-06-29 NOTE — BSMART NOTE
06/29/20 1638 Group Therapy Group Social work Attendance Attended Number of participants 8 Time in 1045 Time out 1130 Total Time 45 MTP problem Thought disorder/psychosis Interventions/techniques Informed;Promoted peer support;Provided feedback; Supported Follows directions Followed directions Interactions Interacted appropriately Mental Status Motivated ,Calm Behavior/appearance Cooperative; Attentive Long Term Risk of Suicide Low Immediate Risk for Suicide Low Suicide Protective Factors Denies intent Risk of Violence Low Risk of Trauma Low Goals Achieved Able to engage in interactions; Able to listen to others; Able to self-disclose

## 2020-06-30 LAB
ANION GAP SERPL CALC-SCNC: 2 MMOL/L (ref 3–18)
BUN SERPL-MCNC: 15 MG/DL (ref 7–18)
BUN/CREAT SERPL: 13 (ref 12–20)
CALCIUM SERPL-MCNC: 9.2 MG/DL (ref 8.5–10.1)
CHLORIDE SERPL-SCNC: 106 MMOL/L (ref 100–111)
CO2 SERPL-SCNC: 28 MMOL/L (ref 21–32)
CREAT SERPL-MCNC: 1.15 MG/DL (ref 0.6–1.3)
GLUCOSE SERPL-MCNC: 104 MG/DL (ref 74–99)
LITHIUM SERPL-SCNC: 0.8 MMOL/L (ref 0.6–1.2)
POTASSIUM SERPL-SCNC: 4.4 MMOL/L (ref 3.5–5.5)
SODIUM SERPL-SCNC: 136 MMOL/L (ref 136–145)

## 2020-06-30 PROCEDURE — 74011250637 HC RX REV CODE- 250/637: Performed by: PSYCHIATRY & NEUROLOGY

## 2020-06-30 PROCEDURE — 80178 ASSAY OF LITHIUM: CPT

## 2020-06-30 PROCEDURE — 65220000003 HC RM SEMIPRIVATE PSYCH

## 2020-06-30 PROCEDURE — 36415 COLL VENOUS BLD VENIPUNCTURE: CPT

## 2020-06-30 PROCEDURE — 80048 BASIC METABOLIC PNL TOTAL CA: CPT

## 2020-06-30 RX ADMIN — DULOXETINE 30 MG: 30 CAPSULE, DELAYED RELEASE ORAL at 08:20

## 2020-06-30 RX ADMIN — LITHIUM CARBONATE 600 MG: 300 TABLET ORAL at 20:43

## 2020-06-30 RX ADMIN — Medication 1 SPRAY: at 08:23

## 2020-06-30 RX ADMIN — LITHIUM CARBONATE 600 MG: 300 TABLET ORAL at 08:20

## 2020-06-30 RX ADMIN — ZOLPIDEM TARTRATE 5 MG: 5 TABLET ORAL at 21:37

## 2020-06-30 RX ADMIN — PRAZOSIN HYDROCHLORIDE 5 MG: 5 CAPSULE ORAL at 21:00

## 2020-06-30 RX ADMIN — CARIPRAZINE 3 MG: 1.5 CAPSULE, GELATIN COATED ORAL at 20:44

## 2020-06-30 RX ADMIN — POLYETHYLENE GLYCOL 3350 17 G: 17 POWDER, FOR SOLUTION ORAL at 20:44

## 2020-06-30 RX ADMIN — HYDROCORTISONE: 1 OINTMENT TOPICAL at 21:35

## 2020-06-30 RX ADMIN — DILTIAZEM HYDROCHLORIDE 120 MG: 120 CAPSULE, COATED, EXTENDED RELEASE ORAL at 08:20

## 2020-06-30 RX ADMIN — VALACYCLOVIR HYDROCHLORIDE 500 MG: 500 TABLET, FILM COATED ORAL at 08:20

## 2020-06-30 NOTE — PROGRESS NOTES
Problem: Suicide  Goal: *STG: Remains safe in hospital  Description: Patient will remain safe in the hospital daily   Outcome: Progressing Towards Goal  Note: Patient will remain safe in the hospital daily  Goal: *STG: Attends activities and groups  Description: Patient will attend scheduled activities and groups daily   Outcome: Progressing Towards Goal  Note: Patient will attend scheduled activities and groups daily     Problem: Depressed Mood (Adult/Pediatric)  Goal: *STG: Complies with medication therapy  Description: Patient will comply with medication therapy daily   Outcome: Progressing Towards Goal  Note: Patient will comply with medication therapy daily     Patient has been visible and active on the unit. Attending groups, medication compliant, and engaging with peers/staff. Consuming meals. Reports that she is no longer hearing voices or feeling like someone is near her. However, she is unable to verbalize if she feels like the medication is working. Denies current thoughts of self harm. Will continue to monitor for safety and support.

## 2020-06-30 NOTE — BH NOTES
Pt appeared to have slept for 5.75 hours thus far; awake and quiet intermittently. Will continue to monitor for safety.

## 2020-06-30 NOTE — BSMART NOTE
ART THERAPY GROUP PROGRESS NOTE PATIENT SCHEDULED FOR GROUP AT: 400 ATTENDANCE:Full PARTICIPATION LEVEL: Participates fully in the art process ATTENTION LEVEL: Able to focus on task FOCUS: Grounding SYMBOLIC & THEMATIC CONTENT AS NOTED IN IMAGERY: She was calm, compliant, and invested in the task at hand. She offered group members encouragement and validation. She shared how she typically comes across as \"happy and normal,\" however claimed that she has a \"constant dark side that lingers\" that is made up of negative and harsh thoughts. She claimed that she has struggled with these thoughts for so long she isn't sure if she wanted to name it as depression or anger, but recognizes she tends to spiral down in her negative thoughts when she is alone. Group discussed different means to work on cognitive distortions and rational vs irrational thinking patterns.

## 2020-06-30 NOTE — GROUP NOTE
COURTNEY ALMONTE GROUP DOCUMENTATION INDIVIDUAL Group Therapy Note Date: 6/30/2020 Group Start Time: 9375 Group End Time: 1534 Group Topic: Nursing [facility] ADULT CHEM Priyanka Velazquez RN IP BH GROUP DOCUMENTATION GROUP Group Therapy Note Attendees: 4 Attendance: Did not attend Radha Duran RN

## 2020-06-30 NOTE — BSMART NOTE
Pt. Is a 51-year-old female  with history of Schizoaffective disorder, bipolar type, currently depressed versus mixed and PTSD. Pt. Was admitted to this facility for depression and ideations to harm self with a plan to cut self with a knife. Pt. Case was discussed in staffing pt. continues to demonstrate improvement. Pt states she is feeling better , sleep is improving and less irritable. Pt discussed positive coping strategies, denies both ideations and hallucinations. Pt. Is less paranoid, mood is improving and has fair insight. SW will continue to assist the pt with dc planning. Daisha Pitt MA, LMHP_R

## 2020-06-30 NOTE — BSMART NOTE
06/30/20 1200 Group Therapy Group Social work (Positive Coping Skills) Number of participants 6 Time in 1045 Time out 1130 Total Time 45 MTP problem Sleep disturbance, paranioa Interventions/techniques Informed;Validated;Promoted peer support;Provided feedback; Supported Follows directions Followed directions Interactions Interacted appropriately Mental Status Motivated Behavior/appearance Cooperative Long Term Risk of Suicide Low Immediate Risk for Suicide Low Suicide Protective Factors Denies intent Risk of Violence Low Risk of Trauma Low Goals Achieved Able to manage/cope with feelings; Able to receive feedback; Able to self-disclose

## 2020-06-30 NOTE — PROGRESS NOTES
Problem: Suicide  Goal: *STG: Remains safe in hospital  Description: Patient will remain safe in the hospital daily   Outcome: Progressing Towards Goal     Problem: Suicide  Goal: *STG: Attends activities and groups  Description: Patient will attend scheduled activities and groups daily   Outcome: Progressing Towards Goal     Problem: Depressed Mood (Adult/Pediatric)  Goal: *STG: Participates in treatment plan  Description: Patient will participate in treatment plan daily   Outcome: Progressing Towards Goal   Patient noted interacting with peers and staff.  brought in more clothing for her today and she was able to wave at him from the window. Engaged in treatment and states she will stay hospitalized s long as it takes for her to get better and be able to function on her own. Denies SI/HI/AH. Contracts for safety. Attends group activities. Compliant with medications. Will continue to monitor for safety.

## 2020-06-30 NOTE — GROUP NOTE
IP  GROUP DOCUMENTATION INDIVIDUAL Group Therapy Note Date: 6/29/2020 Group Start Time: 80 Group End Time: 2015 Group Topic: Nursing [facility] ADULT CHEM DEP Omar Bowden, RN 
 
[facility] GROUP DOCUMENTATION GROUP Group Therapy Note Attendees: 5 Attendance: Attended Interventions/techniques: Challenged and Informed Follows Directions: Followed directions Interactions: Interacted appropriately Mental Status: Calm Behavior/appearance: Attentive Goals Achieved: Able to listen to others and Able to give feedback to another Cher Fortune. Arcadio Dance

## 2020-06-30 NOTE — PROGRESS NOTES
[address]  Inpatient Progress Note     Date of Service: 06/30/20  Hospital Day: 7     Subjective/Interval History   06/30/20    Treatment Team Notes:  Notes reviewed and/or discussed and report that Meghann Holland is a pt with Schizoaffective disorder, currently considering ending her marriage. The pt and her   before. She even began divorce procedures, which she eventually cancelled. Patient interview: Meghann Holland was interviewed by this writer today. Concerns raised about the pt deciding to end her marriage, this being associated to a degree to her self esteem being low. \"He will do better with some one else. \"  Marriage therapy suggested. The issue of divorce could be addressed during the sessions, to help them out to proceed with it, if that is what the pt wants to do. However should be associated to their not being compatible, rather than to her low opinion of self. Will discuss with the Tx Team tomorrow. Objective     Visit Vitals  /66 (BP 1 Location: Right arm, BP Patient Position: Sitting)   Pulse 78   Temp 97.6 °F (36.4 °C)   Resp 16   Ht 5' 9\" (1.753 m)   Wt 108.4 kg (239 lb)   BMI 35.29 kg/m²     Vitals are stable. Recent Results (from the past 24 hour(s))   LITHIUM    Collection Time: 06/30/20  6:13 AM   Result Value Ref Range    Lithium level 0.80 0.6 - 1.2 MMOL/L   METABOLIC PANEL, BASIC    Collection Time: 06/30/20  6:13 AM   Result Value Ref Range    Sodium 136 136 - 145 mmol/L    Potassium 4.4 3.5 - 5.5 mmol/L    Chloride 106 100 - 111 mmol/L    CO2 28 21 - 32 mmol/L    Anion gap 2 (L) 3.0 - 18 mmol/L    Glucose 104 (H) 74 - 99 mg/dL    BUN 15 7.0 - 18 MG/DL    Creatinine 1.15 0.6 - 1.3 MG/DL    BUN/Creatinine ratio 13 12 - 20      GFR est AA >60 >60 ml/min/1.73m2    GFR est non-AA 50 (L) >60 ml/min/1.73m2    Calcium 9.2 8.5 - 10.1 MG/DL     Above results noticed. Therapeutic li levels noticed.  Good renal function maintain with currently increased li dose. Mental Status Examination     Appearance/Hygiene 52 y.o. BLACK OR  female  Hygiene: good. Behavior/Social Relatedness Appropriate   Musculoskeletal Gait/Station: appropriate  Tone (flaccid, cogwheeling, spastic): not assessed  Psychomotor (hyperkinetic, hypokinetic): calm   Involuntary movements (tics, dyskinesias, akathisa, stereotypies): none   Speech   Rate, rhythm, volume, fluency and articulation are appropriate   Mood   Mixed. Affect    Sad. Thought Process Linear and goal directed   Thought Content and Perceptual Disturbances Denies self-injurious behavior (SIB), suicidal ideation (SI), aggressive behavior or homicidal ideation (HI)  Depressed, however auditory and tactile hallucinations are improving. Sensorium and Cognition  Grossly intact   Insight  Limited. Judgment Limited. Assessment/Plan      Psychiatric Diagnoses:   Patient Active Problem List   Diagnosis Code    Schizoaffective disorder, bipolar type ([facility] F25.0    Atrial fibrillation, currently in sinus rhythm Z86.79    Dyslipidemia E78.5    Obesity (BMI 30-39. 9) E66.9    History of migraine headaches Z86.69       Medical Diagnoses: same. Psychosocial and contextual factors: marital difficulties. Level of impairment/disability: high by hx. 1.  Due to improvement of her psychosis, will maintain her cariprazine dose the same. 2.  Reviewed instructions, risks, benefits and side effects of medications  3. Disposition/Discharge Date: self-care/home.     Lila Dixon MD, [address] [place] [address]  Psychiatry

## 2020-06-30 NOTE — BSMART NOTE
OCCUPATIONAL THERAPY PROGRESS NOTE Group Time:  8191 Attendance: The patient attended full group. Participation: The patient participated fully in the activity. Attention: The patient was able to focus on the activity. Interaction: The patient frequently interacts with others. Mood much brighter. Observed prior to group playing an game with peers and smiling and interacting.

## 2020-07-01 PROCEDURE — 74011250637 HC RX REV CODE- 250/637: Performed by: PSYCHIATRY & NEUROLOGY

## 2020-07-01 PROCEDURE — 65220000003 HC RM SEMIPRIVATE PSYCH

## 2020-07-01 RX ADMIN — LITHIUM CARBONATE 600 MG: 300 TABLET ORAL at 08:30

## 2020-07-01 RX ADMIN — CARIPRAZINE 3 MG: 1.5 CAPSULE, GELATIN COATED ORAL at 21:02

## 2020-07-01 RX ADMIN — Medication 1 SPRAY: at 13:12

## 2020-07-01 RX ADMIN — HYDROCORTISONE: 1 OINTMENT TOPICAL at 21:06

## 2020-07-01 RX ADMIN — PRAZOSIN HYDROCHLORIDE 5 MG: 5 CAPSULE ORAL at 21:00

## 2020-07-01 RX ADMIN — LITHIUM CARBONATE 600 MG: 300 TABLET ORAL at 21:02

## 2020-07-01 RX ADMIN — DILTIAZEM HYDROCHLORIDE 120 MG: 120 CAPSULE, COATED, EXTENDED RELEASE ORAL at 08:30

## 2020-07-01 RX ADMIN — DULOXETINE 30 MG: 30 CAPSULE, DELAYED RELEASE ORAL at 08:30

## 2020-07-01 RX ADMIN — VALACYCLOVIR HYDROCHLORIDE 500 MG: 500 TABLET, FILM COATED ORAL at 08:30

## 2020-07-01 NOTE — PROGRESS NOTES
[address]  Inpatient Progress Note     Date of Service: 07/01/20  Hospital Day: 8     Subjective/Interval History   07/01/20    Treatment Team Notes:  Notes reviewed and/or discussed and report that Meghann Diallo is a pt with a hx of Schizoaffective disorder bipolar type, mre mixed presented to the Tx Team today. The pt was present at the time of the session. Patient interview: Meghann Diallo was interviewed by this writer today. Described self as improving, her hallucinations both auditory and tactile are in remission. Depression is improving. Agreeable to MT after discharged. If stable, she will go home tomorrow. Objective     Visit Vitals  /84 (BP 1 Location: Right arm, BP Patient Position: At rest)   Pulse (!) 106   Temp 97.2 °F (36.2 °C)   Resp 18   Ht 5' 9\" (1.753 m)   Wt 108.4 kg (239 lb)   BMI 35.29 kg/m²     Mild tachy and mild elevation of BP. No results found for this or any previous visit (from the past 24 hour(s)). Mental Status Examination     Appearance/Hygiene 52 y.o. BLACK OR  female  Hygiene: good. Behavior/Social Relatedness Appropriate   Musculoskeletal Gait/Station: appropriate  Tone (flaccid, cogwheeling, spastic): not assessed  Psychomotor (hyperkinetic, hypokinetic): calmer   Involuntary movements (tics, dyskinesias, akathisa, stereotypies): none   Speech   Rate, rhythm, volume, fluency and articulation are appropriate   Mood   Mixed, less depressed, less manic like. Affect    More appropriate. Thought Process Linear and goal directed   Thought Content and Perceptual Disturbances Denies self-injurious behavior (SIB), suicidal ideation (SI), aggressive behavior or homicidal ideation (HI)    Denies auditory and tactile hallucinations. No evidence of harm to self or others. Sensorium and Cognition  Grossly intact   Insight  Improved. Judgment Fairly good.         Assessment/Plan      Psychiatric Diagnoses:   Patient Active Problem List   Diagnosis Code    Schizoaffective disorder, bipolar type ([facility] F25.0    Atrial fibrillation, currently in sinus rhythm Z86.79    Dyslipidemia E78.5    Obesity (BMI 30-39. 9) E66.9    History of migraine headaches Z86.69       Medical Diagnoses: same. Psychosocial and contextual factors: same. Level of impairment/disability: moderate. 1.  If stable, will discharge tomorrow. Medications the same as currently prescribed. 2.  Reviewed instructions, risks, benefits and side effects of medications  3. Disposition/Discharge Date: self-care/home.     Susan Miranda MD, [facility]  Psychiatry

## 2020-07-01 NOTE — GROUP NOTE
COURTNEY  GROUP DOCUMENTATION INDIVIDUAL Group Therapy Note Date: 7/1/2020 Group Start Time: 5875 Group End Time: 1326 Group Topic: Nursing [facility] ADULT CHEM Priyanka Velazquez, RN 
 
[facility] GROUP DOCUMENTATION GROUP Group Therapy Note Attendees:  4 Attendance: Attended Patient's Goal:  Art reflection Interventions/techniques: Art integration, Promoted peer support and Reinforced Follows Directions: Followed directions Interactions: Interacted appropriately Mental Status: Calm Behavior/appearance: Attentive and Cooperative Goals Achieved: Able to engage in interactions Additional Notes:   
 
Eli Berry RN

## 2020-07-01 NOTE — PROGRESS NOTES
Problem: Suicide  Goal: *STG: Remains safe in hospital  Description: Patient will remain safe in the hospital daily   Outcome: Progressing Towards Goal  Note: Patient will remain safe in the hospital daily  Goal: *STG: Attends activities and groups  Description: Patient will attend scheduled activities and groups daily   Outcome: Progressing Towards Goal  Note: Patient will attend scheduled activities and groups daily     Patient has been visible and active in the day area today. She has attended groups, interacted with peers, and been medication compliant. She has attended a treatment team staffing today and her discharge is scheduled for tomorrow. She intends to continue with coping and outpatient care. Denies current thoughts of self harm and denies current A/V hallucinations. Will continue to monitor for safety and support.

## 2020-07-01 NOTE — BSMART NOTE
OCCUPATIONAL THERAPY PROGRESS NOTE Group Time:  2293 Attendance: The patient attended full group. Participation: The patient participated fully in the activity. Attention: The patient was able to focus on the activity. Interaction: The patient frequently interacts with others. .Mood improved.

## 2020-07-01 NOTE — BSMART NOTE
Pt. Is a 70-year-old female  with history of Schizoaffective disorder, bipolar type, currently depressed versus mixed and PTSD. Pt. Was admitted to this facility for depression and ideations to harm self with a plan to cut self with a knife. Pt.'s case was dc in staffing. SW Contact:   SW discussed  Dc planning with pt. \" I am feeling better today\". I am ready to leave. SW discussed coping strategies. Pt reflected on activities such as cooking, enjoying time with her family and continued medication compliance. Pt. Talked about relationship with spouse. p reports she plans to go to couples counseling. Pt. 's affect and mood are improving. Pt. denies both ideations and hallucinations. Pt. Is less paranoid, mood is improving and has fair insight. SW will continue to assist the pt with dc planning.  
  
Tin Mckenzie, LMHP_R

## 2020-07-01 NOTE — PROGRESS NOTES
conducted an Spirituality Group for Meghann Holland, who is a 52 y.o.,female. Patient's Primary Language is: [place]. According to the patient's EMR Islam Affiliation is: Mykel Marroquin.     The reason the Patient came to the hospital is:   Patient Active Problem List    Diagnosis Date Noted    Atrial fibrillation, currently in sinus rhythm 06/27/2020    Dyslipidemia 06/27/2020    Obesity (BMI 30-39.9) 06/27/2020    History of migraine headaches 06/27/2020    Schizoaffective disorder, bipolar type ([facility] 06/23/2020         conducted Spirituality Group \"Introduction to Stress Management\" and the patient was one of the participants. The  provided the following Interventions:  Continued the relationship of care and support. Listened empathically. Offered prayer and assurance of continued prayer on patients behalf. Chart reviewed. The following outcomes were achieved:  Patient expressed gratitude for 's visit.  w  Assessment:  There are no further spiritual or Gnosticism issues which require Spiritual Care Services interventions at this time. Plan:  Chaplains will continue to follow and will provide pastoral care on an as needed/requested basis.  recommends bedside caregivers page  on duty if patient shows signs of acute spiritual or emotional distress. Chaplain Nate FLOREZ  [address] [facility]   [phone]

## 2020-07-01 NOTE — BH NOTES
Patient spend most of the evening in the milieu interacting with peers and watching television. Patient attended groups and participated appropriately. Patient expressed how much she enjoyed her dinner. Patient had snack and retired early to bed. Staff will continue to monitor patient for safety.

## 2020-07-01 NOTE — GROUP NOTE
COURTNEY ALMONTE GROUP DOCUMENTATION INDIVIDUAL Group Therapy Note Date: 6/30/2020 Group Start Time: 2000 Group End Time: 2015 Group Topic: [employer] [facility] AD[facility] CHEM DEP LILLIAN Jensen GROUP DOCUMENTATION GROUP Group Therapy Note Attendees: 6 Attendance: Did not attend Patient on the phone.  
 
Janina Moser RN

## 2020-07-01 NOTE — PROGRESS NOTES
NUTRITION    Nutrition Screen      RECOMMENDATIONS / PLAN:     - No further nutrition interventions indicated at this time. - Continue RD inpatient monitoring and evaluation     NUTRITION DIAGNOSIS & INTERVENTIONS:     - Meals/snacks: general healthy diet    Nutrition Diagnosis: No nutrition diagnosis at this time. ASSESSMENT:     Hx of depression admitted from ED for SI. Pt reports good meal intake and appetite. Tolerating diet. Obesity noted, encouraged adequate portion sizes for promotion of gradual wt loss. C/o hx of constipation requiring miralax, DM 6/29 per pt report    Nutritional intake adequate to meet patients estimated nutritional needs:  Yes    Diet: DIET REGULAR    Food Allergies: NKFA  Current Appetite: Good  Appetite/meal intake prior to admission: Good  Feeding Limitations:  [] Swallowing Difficulty       [] Chewing Difficulty       [] Other   Current Meal Intake: No data found. Gastrointestinal Issues:  [] Yes    [x] No: none known   Skin Integrity:  WDL    Pertinent Medications:  Reviewed: miralax   Labs:  Reviewed     Anthropometrics:  Ht Readings from Last 1 Encounters:   06/23/20 5' 9\" (1.753 m)       Last 3 Recorded Weights in this Encounter    06/23/20 1031   Weight: 108.4 kg (239 lb)       Body mass index is 35.29 kg/m².   Obese Class II    Weight History:   Weight Metrics 6/23/2020 6/22/2020 4/28/2020 3/28/2019 1/28/2019 5/8/2018 5/5/2018   Weight 239 lb 252 lb 252 lb 233 lb 233 lb 230 lb 230 lb   BMI 35.29 kg/m2 37.21 kg/m2 37.21 kg/m2 34.41 kg/m2 34.41 kg/m2 33.97 kg/m2 33.97 kg/m2       Admitting Diagnosis: Schizoaffective disorder ([facility] [F25.9]  Past Medical History:   Diagnosis Date    Depression     Migraine     Psychiatric disorder     Schizoaffective disorder ([facility]     Sleep disorder         Education Needs:        [x] None identified  [] Identified - Not appropriate at this time  []  Identified and addressed - refer to education log  Learning Limitations:   [x] None identified  [] Identified    Cultural, Moravian & ethnic food preferences identified:  [x] None    [] Yes      ESTIMATED NUTRITION NEEDS:     0773-2826 kcal (MSJx1-1.2),  gm protein (0.8-1 gm/kg), 1 mL/kcal  Based on: 108 kg       [x] Actual BW      [] IBW          MONITORING & EVALUATION:     Nutrition Goal(s):   - PO nutrition intake will continue to meet >75% of patients estimated nutritional needs over the next 7 days. Outcome: New/Initial goal     Monitor: [x] Food and nutrient intake   [x] Food and nutrient administration  [x] Comparative standards   [x] Nutrition-focused physical findings   [x] Anthropometric Measurements   [x] Treatment/therapy   [x] Biochemical data, medical tests, and procedures         Previous Recommendations (for follow-up assessments only):    Not Applicable      Discharge Planning: No nutritional discharge needs at this time.     [x]  Participated in care planning, discharge planning, & interdisciplinary rounds as appropriate      Anneliese Stevens RD   Pager: [phone]

## 2020-07-02 VITALS
RESPIRATION RATE: 18 BRPM | WEIGHT: 239 LBS | HEIGHT: 69 IN | DIASTOLIC BLOOD PRESSURE: 80 MMHG | HEART RATE: 94 BPM | BODY MASS INDEX: 35.4 KG/M2 | TEMPERATURE: 98.9 F | SYSTOLIC BLOOD PRESSURE: 139 MMHG

## 2020-07-02 PROCEDURE — 74011250637 HC RX REV CODE- 250/637: Performed by: PSYCHIATRY & NEUROLOGY

## 2020-07-02 RX ORDER — PRAZOSIN HYDROCHLORIDE 5 MG/1
5 CAPSULE ORAL
Qty: 1 CAP | Refills: 0 | Status: SHIPPED
Start: 2020-07-02

## 2020-07-02 RX ORDER — DULOXETIN HYDROCHLORIDE 30 MG/1
30 CAPSULE, DELAYED RELEASE ORAL DAILY
Qty: 30 CAP | Refills: 0 | Status: SHIPPED | OUTPATIENT
Start: 2020-07-03

## 2020-07-02 RX ORDER — LITHIUM CARBONATE 300 MG
600 TABLET ORAL 2 TIMES DAILY
Qty: 120 TAB | Refills: 0 | Status: SHIPPED | OUTPATIENT
Start: 2020-07-02

## 2020-07-02 RX ORDER — MAG HYDROX/ALUMINUM HYD/SIMETH 200-200-20
SUSPENSION, ORAL (FINAL DOSE FORM) ORAL 3 TIMES DAILY
Qty: 28 G | Refills: 0 | Status: SHIPPED | OUTPATIENT
Start: 2020-07-02

## 2020-07-02 RX ORDER — DILTIAZEM HYDROCHLORIDE 120 MG/1
120 CAPSULE, COATED, EXTENDED RELEASE ORAL DAILY
Qty: 1 CAP | Refills: 0 | Status: SHIPPED
Start: 2020-07-03

## 2020-07-02 RX ORDER — POLYETHYLENE GLYCOL 3350 17 G/17G
17 POWDER, FOR SOLUTION ORAL DAILY
Qty: 30 PACKET | Refills: 0 | Status: SHIPPED | OUTPATIENT
Start: 2020-07-02

## 2020-07-02 RX ADMIN — VALACYCLOVIR HYDROCHLORIDE 500 MG: 500 TABLET, FILM COATED ORAL at 08:05

## 2020-07-02 RX ADMIN — LITHIUM CARBONATE 600 MG: 300 TABLET ORAL at 08:05

## 2020-07-02 RX ADMIN — DILTIAZEM HYDROCHLORIDE 120 MG: 120 CAPSULE, COATED, EXTENDED RELEASE ORAL at 08:05

## 2020-07-02 RX ADMIN — DULOXETINE 30 MG: 30 CAPSULE, DELAYED RELEASE ORAL at 08:05

## 2020-07-02 NOTE — PROGRESS NOTES
Pt.'s prescriptions and discharge instructions given. Pt. Wilburn Cowden understanding. She is escorted out of the unit by staff anbulatory.

## 2020-07-02 NOTE — BH NOTES
MHT NOTE: The pt has been out in the milieu for the majority of the shift conversing with surrounding pts and staff. She has exhibited a pleasant and cooperative behavior. The pt attended dinner, snack , and groups. She has complied with performing her ADL's, abiding by the unit rules, and utilizing the non-skid footwear that was provided for her safety. She did not experience any falls. The pt has not voiced thoughts of SI or HI. She will continue to be monitored for behavior, location , and safety for the remaining duration of the shift.

## 2020-07-02 NOTE — GROUP NOTE
[facility] GROUP DOCUMENTATION INDIVIDUAL Group Therapy Note Date: 7/2/2020 Group Start Time: 1000 Group End Time: 7189 Group Topic: Nursing [facility] 1 ADULT CHEM DEP John Gusman RN 
 
[facility] GROUP DOCUMENTATION GROUP Group Therapy Note Attendees: 6 Attendance: Attended Patient's Goal:  What I can control and can not control Interventions/techniques: Informed, Promoted peer support and Provide feedback Follows Directions: Followed directions Interactions: Interacted appropriately Mental Status: Calm Behavior/appearance: Cooperative Goals Achieved: Able to engage in interactions and Able to listen to others Zack Howard RN

## 2020-07-02 NOTE — GROUP NOTE
[facility]  GROUP DOCUMENTATION INDIVIDUAL Group Therapy Note Date: 7/1/2020 Group Start Time: 2015 Group End Time: 2030 Group Topic: Nursing [facility] 1 ADULT CHEM DEP Ulises Jameson, RN 
 
[facility] GROUP DOCUMENTATION GROUP Group Therapy Note Attendees: 8 Attendance: Attended Interventions/techniques: Challenged and Informed Follows Directions: Followed directions Interactions: Interacted appropriately Mental Status: Calm Behavior/appearance: Attentive and Cooperative Goals Achieved: Able to engage in interactions and Able to listen to others Nusrat Noel

## 2020-07-02 NOTE — DISCHARGE INSTRUCTIONS
BEHAVIORAL HEALTH NURSING DISCHARGE NOTE      The following personal items collected during your admission are returned to you:   Dental Appliance: Dental Appliances: None  Vision: Visual Aid: None  Hearing Aid:    Jewelry: Jewelry: Bracelet, Earrings, Watch, With patient, Other (comment)(clearstone,grey-tone,hyszkqyumquwosjfazdgxcpq-xyvw-ebdsyjms)  Clothing: Clothing: Footwear, Hat, Jacket/Coat, Pants, Shirt, Socks, Undergarments, With patient, Other (comment)(hat-in-locker)  Other Valuables: Other Valuables: Cell Phone, Eyeglasses, [address] [place] [address], Money (comment), Personal electronic devices (comment), Personal toiletries, Beronica Pena, With patient, Other (comment)(eyeglasse,onpt,$60.00,wallett,cellphonenocracks,sevcardssecu)  Valuables sent to safe: Personal Items Sent to Safe: (shawna,checkbook,severalcards)      PATIENT INSTRUCTIONS:    Patient armband removed and shredded    The discharge information has been reviewed with the patient. The patient verbalized understanding.     Phone numbers to remember:  [address] desk:  [address] [place] [address] Emergency Services:  [phone]  Suicide Prevention:  [phone]

## 2020-07-02 NOTE — BSMART NOTE
Pt. Is a 27-year-old female  with history of Schizoaffective disorder, bipolar type, currently depressed versus mixed and PTSD. Pt. Was admitted to this facility for depression and ideations to harm self with a plan to cut self with a knife. Pt.'s case was discussed. Pt. Will be dc today. SW discussed dc plan with  Pt. Currently denies ideations and hallucinations. SW encouraged continued medication and treatment compliance. SW discussed safety plan. Pt plans to return to her home with family. Pt. plans to follow-up [facility] Services Dept [place] [address], [facility], [address] [place] [address] Phone: [phone] and with at [facility], [facility] phone [phone] [facility]  [address] [place] [address], [address] [facility] [phone] or Leigh Wilson [address] [place] [address], [address] [place] [address]  [phone]. Tin Diss, [email or web address]_R

## 2020-07-10 NOTE — BH NOTES
Patient noted with brighter affect today compared to previous day. Sociable with select peers. Attending group activities with good participation. Denies SI/HI/AH. Contracts firmly to no intent to harm herself. Compliant with medications. Will continue to monitor and provide for safety. 0 = understands/communicates without difficulty

## 2020-07-14 NOTE — DISCHARGE SUMMARY
[facility]    Name:  Antonia Gaspar  MR#:   [id number]  :  1970  ACCOUNT #:  [de-identified]  ADMIT DATE:  2020  DISCHARGE DATE:  2020    SIGNIFICANT FINDINGS:  History and physical exam was performed shortly after the patient was admitted to the facility, attention invited to this document, a place where the reasons for which the patient was brought to the attention of [facility] Emergency Department, the findings upon her being examined there and the reasons for which she required to be hospitalized psychiatrically are very clearly stated. For the purpose of this discharge summary, the reader is advised that the patient who has a history of a schizoaffective disorder bipolar type was brought to the attention of [address] [place] [address] with her being described as being increasingly depressed and psychotic. Thoughts of harming self were described by the patient; however, no specific plan other than cutting her wrist is mentioned. The patient stated that she had lapsed on her medications compliance since she was staying in [place] with family members and her  who is very supportive of her and who is always sure that she takes her medications did not go on the trip to [place] at this time. So, not taking her medications, becoming increasingly depressed, describing the presence of egodystonic auditory hallucinations, the patient also described the above-mentioned suicidal thoughts. The patient with a chronic history of schizoaffective disorder, has required to be treated through the [facility] in the [facility] and was then offered with a voluntary admission to our facility to which she accepted.     PRIOR PSYCHIATRIC HISTORY:  In addition to her history of a mood disorder which was diagnosed, by the way, when she was in her 25s has also had a history of post-traumatic stress disorder which appears to be chronic. The patient had a history of responding in the past to combinations of lithium carbonate which was prescribed for mood stabilization with her also requiring, per prescription, a combination of antidepressants which have included prescriptions for bupropion which was switched from a prior prescription of duloxetine which apparently did not help. The patient was also being prescribed with aripiprazole 30 mg a day which she had been taking; however, even though was taking the maximum suggested by the [employer] with regard to dosing, the patient was having the above-mentioned psychotic symptoms. MEDICAL HISTORY:  Remarkable for a history of atrial fibrillation for which she has been prescribed with Cardizem CD in addition to a negative surgical history. She does have a history of herpes for which she is being prescribed with chronic prescriptions for valacyclovir (Valtrex), and trazodone for her chronic sleeping problems. ALLERGIES:  THE PATIENT IS DESCRIBED TO BE ALLERGIC TO IODINE. Prior to her admission, multiple labs were performed at [facility] ER including a CBC with differential that showed normal test results. Urinalysis was negative. Blood chemistry showed normal electrolytes and blood sugar 122, normal kidney functioning tests with a BUN of 16 and creatinine of 1.2, estimated GFR above 60 mL/min and normal liver function tests. Urine pregnancy test was negative. Acetaminophen and salicylate levels were below range. Alcohol levels below 3.0. Urine drug screen negative. Since admission to our facility, other tests performed had included a lipid panel that showed triglycerides of 98, total cholesterol of 197, HDL of 48, cholesterol-HDL ratio 4.1 and LDL of 129.4. A1c normal at 5.6%. TSH normal at 0.84 international units/mL. Due to her history of Afib, an electrocardiogram was also requested, results as stated below.     COURSE DURING HOSPITALIZATION AND TREATMENT:  Admitted to the adult CD program, the patient was seen on daily individual psychiatric basis and was referred to the groups within the context of the program.  A rather depressed and at the same time labile patient with possibility of showing mixed like bipolar symptoms associated to her chronic history of schizoaffective disorder, some medications changes occurred during this admission including a switch from aripiprazole to cariprazine with Roderick Sames being very well tolerated by the patient, she was continued on prescription for lithium carbonate which was increased to 600 mg twice a day in addition to her being maintained on treatment with duloxetine which she indicated had helped her with her depression. In general, the undersigned tends not to prescribe antidepressants to individuals with underlying bipolar like symptoms; however, the patient had described improvement with her depression and not only positive response in that respect, but also not developing hypomanic or manic like symptoms. While in the facility, other tests performed included a lithium level which showed to be 0.30 mmol/L with a dose of lithium carbonate at 300 mg twice a day, reason for which we proceeded to increase the dose to 600 mg twice a day with a lithium level drawn on 06/30/2020 having shown a result considered to be within therapeutic range at 0.80 mmol/L. A repeat of the patient's BMP then showed her electrolytes being normal, her blood sugar being 104 and her kidney function tests remaining within normal limits with a BUN of 15 and creatinine of 1.15. Estimated GFR above 60 mL/min. An electrocardiogram done during this hospitalization showed the patient being in a normal sinus rhythm with a ventricular rate response of 63 beats per minute, QT of 408 and QTC of 417, considered to be a normal electrocardiogram as read by our cardiology department.     With this combination of treatment approaches, the patient showed very positive improvement. By the time of discharge, she was much more stable, denying any psychotic symptoms which had included when she was admitted the presence of not only auditory hallucinations, but also tactile like hallucinations which consisted on her having a sensation of someone touching her on her face and lower extremities. The symptoms have abated. The patient's depression was also found to be much improved with her being not suicidal.  Some concerns were raised by the fact that the patient did mention that she was considering ending the relationship with her . We strongly suggested not making a decision during this admission, rather to discuss this with her individual therapist and again giving time to consider the thought of  from her  since she had described him as being very supportive at the time of admission. CONDITION UPON DISCHARGE:  Not suicidal or homicidal, the patient's psychosis had remitted and showing again no evidence of cognitive impairment. The patient did have a prescription for prazosin 5 mg at bedtime to help with her insomnia and nightmare associated to her history of PTSD. FINAL DIAGNOSES:  AXIS I:  Schizoaffective disorder, bipolar type, currently mixed. Post-traumatic stress disorder, chronic. AXIS II:  Noncontributory. AXIS III:  Atrial fibrillation by history, currently in sinus rhythm. Obesity. Migraine headaches by history. Dyslipidemia by history. History of insomnia associated to nightmares, also associated to the above-mentioned history of post-traumatic stress disorder. DISPOSITION:  The patient was discharged with outpatient treatment referrals back to her PCP and also to continue with outpatient care with individual therapy sessions and considering the possibility of joint sessions with her  and also medication checkups.     PRESCRIPTIONS UPON DISCHARGE:  The following prescriptions were given for 30 days without refills including prescriptions for cariprazine (Vraylar) 3 mg capsules one every night; duloxetine 30 mg capsules, #30, to take one every morning; lithium carbonate 300 mg tablets to take two twice daily; MiraLax 17 g packets to add 8 ounces of water to take one daily due to chronic history of constipation. She was also suggested to continue prescriptions for atorvastatin 40 mg daily for her history of dyslipidemia, diltiazem  mg capsules one daily, omeprazole 20 mg daily before breakfast due to history of GERD, prazosin 5 mg capsules one every night to help with her history of nightmares, Valtrex 500 mg daily, zolmitriptan (Zomig-ZMT) 5 mg as needed for migraine headaches, Ambien 5 mg to take one every night as needed for insomnia. No evidence of medication-related side effects noted upon discharge. Since we did not know if the patient's  was going to immediately accept the prescription for cariprazine before doing a preapproval, the patient was asked to come to our outpatient office with [address] [place] [address], a place where we gave her two weeks' supplies of cariprazine (Vraylar) 3 mg capsules to take one every night, this to give her enough time to take her prescription to the [facility] or the pharmacy of her choice to get a refill for her. PROGNOSIS:  Fair to good depending upon treatment compliance and further treatment response.         Darrell Schneider MD, LFAPA      [id number]  D:  07/13/2020 16:40  T:  07/14/2020 4:02  JOB #:  [id number]

## 2022-03-18 PROBLEM — E66.9 OBESITY (BMI 30-39.9): Status: ACTIVE | Noted: 2020-06-27

## 2022-03-19 PROBLEM — Z86.79 ATRIAL FIBRILLATION, CURRENTLY IN SINUS RHYTHM: Status: ACTIVE | Noted: 2020-06-27

## 2022-03-19 PROBLEM — Z86.69 HISTORY OF MIGRAINE HEADACHES: Status: ACTIVE | Noted: 2020-06-27

## 2022-03-19 PROBLEM — E78.5 DYSLIPIDEMIA: Status: ACTIVE | Noted: 2020-06-27

## 2022-03-19 PROBLEM — F25.0 SCHIZOAFFECTIVE DISORDER, BIPOLAR TYPE (HCC): Status: ACTIVE | Noted: 2020-06-23

## 2023-10-19 ENCOUNTER — HOSPITAL ENCOUNTER (OUTPATIENT)
Dept: GENERAL RADIOLOGY | Age: 53
Discharge: HOME | End: 2023-10-19
Payer: OTHER GOVERNMENT

## 2023-10-19 DIAGNOSIS — M25.50 POLYARTHRALGIA: ICD-10-CM

## 2023-10-19 PROCEDURE — 73120 X-RAY EXAM OF HAND: CPT

## 2023-10-19 PROCEDURE — 73620 X-RAY EXAM OF FOOT: CPT

## 2023-10-19 PROCEDURE — 72202 X-RAY EXAM SI JOINTS 3/> VWS: CPT
